# Patient Record
Sex: FEMALE | Race: ASIAN | NOT HISPANIC OR LATINO | Employment: STUDENT | URBAN - METROPOLITAN AREA
[De-identification: names, ages, dates, MRNs, and addresses within clinical notes are randomized per-mention and may not be internally consistent; named-entity substitution may affect disease eponyms.]

---

## 2024-01-30 ENCOUNTER — HOSPITAL ENCOUNTER (EMERGENCY)
Facility: HOSPITAL | Age: 24
Discharge: HOME | End: 2024-01-30
Payer: MEDICAID

## 2024-01-30 ENCOUNTER — APPOINTMENT (OUTPATIENT)
Dept: RADIOLOGY | Facility: HOSPITAL | Age: 24
End: 2024-01-30
Payer: MEDICAID

## 2024-01-30 VITALS
SYSTOLIC BLOOD PRESSURE: 131 MMHG | WEIGHT: 170 LBS | HEIGHT: 63 IN | BODY MASS INDEX: 30.12 KG/M2 | OXYGEN SATURATION: 98 % | HEART RATE: 77 BPM | DIASTOLIC BLOOD PRESSURE: 87 MMHG | TEMPERATURE: 96.8 F | RESPIRATION RATE: 18 BRPM

## 2024-01-30 DIAGNOSIS — N13.2 HYDRONEPHROSIS WITH RENAL AND URETERAL CALCULUS OBSTRUCTION: ICD-10-CM

## 2024-01-30 DIAGNOSIS — N20.2 CALCULUS OF KIDNEY WITH CALCULUS OF URETER: Primary | ICD-10-CM

## 2024-01-30 DIAGNOSIS — N30.01 ACUTE CYSTITIS WITH HEMATURIA: ICD-10-CM

## 2024-01-30 LAB
ALBUMIN SERPL BCP-MCNC: 4.8 G/DL (ref 3.4–5)
ALP SERPL-CCNC: 61 U/L (ref 33–110)
ALT SERPL W P-5'-P-CCNC: 14 U/L (ref 7–45)
ANION GAP SERPL CALC-SCNC: 14 MMOL/L (ref 10–20)
APPEARANCE UR: ABNORMAL
AST SERPL W P-5'-P-CCNC: 12 U/L (ref 9–39)
B-HCG SERPL-ACNC: <2 MIU/ML
BASOPHILS # BLD AUTO: 0.07 X10*3/UL (ref 0–0.1)
BASOPHILS NFR BLD AUTO: 0.6 %
BILIRUB SERPL-MCNC: 0.3 MG/DL (ref 0–1.2)
BILIRUB UR STRIP.AUTO-MCNC: NEGATIVE MG/DL
BUN SERPL-MCNC: 12 MG/DL (ref 6–23)
CALCIUM SERPL-MCNC: 9.6 MG/DL (ref 8.6–10.3)
CHLORIDE SERPL-SCNC: 105 MMOL/L (ref 98–107)
CO2 SERPL-SCNC: 23 MMOL/L (ref 21–32)
COLOR UR: YELLOW
CREAT SERPL-MCNC: 0.68 MG/DL (ref 0.5–1.05)
EGFRCR SERPLBLD CKD-EPI 2021: >90 ML/MIN/1.73M*2
EOSINOPHIL # BLD AUTO: 0.14 X10*3/UL (ref 0–0.7)
EOSINOPHIL NFR BLD AUTO: 1.2 %
ERYTHROCYTE [DISTWIDTH] IN BLOOD BY AUTOMATED COUNT: 11.9 % (ref 11.5–14.5)
FLUAV RNA RESP QL NAA+PROBE: NOT DETECTED
FLUBV RNA RESP QL NAA+PROBE: NOT DETECTED
GLUCOSE SERPL-MCNC: 117 MG/DL (ref 74–99)
GLUCOSE UR STRIP.AUTO-MCNC: NEGATIVE MG/DL
HCT VFR BLD AUTO: 42.1 % (ref 36–46)
HGB BLD-MCNC: 14.2 G/DL (ref 12–16)
IMM GRANULOCYTES # BLD AUTO: 0.08 X10*3/UL (ref 0–0.7)
IMM GRANULOCYTES NFR BLD AUTO: 0.7 % (ref 0–0.9)
KETONES UR STRIP.AUTO-MCNC: NEGATIVE MG/DL
LACTATE SERPL-SCNC: 1.1 MMOL/L (ref 0.4–2)
LEUKOCYTE ESTERASE UR QL STRIP.AUTO: ABNORMAL
LIPASE SERPL-CCNC: 13 U/L (ref 9–82)
LYMPHOCYTES # BLD AUTO: 3.88 X10*3/UL (ref 1.2–4.8)
LYMPHOCYTES NFR BLD AUTO: 32.8 %
MAGNESIUM SERPL-MCNC: 2.11 MG/DL (ref 1.6–2.4)
MCH RBC QN AUTO: 30.1 PG (ref 26–34)
MCHC RBC AUTO-ENTMCNC: 33.7 G/DL (ref 32–36)
MCV RBC AUTO: 89 FL (ref 80–100)
MONOCYTES # BLD AUTO: 0.54 X10*3/UL (ref 0.1–1)
MONOCYTES NFR BLD AUTO: 4.6 %
MUCOUS THREADS #/AREA URNS AUTO: ABNORMAL /LPF
NEUTROPHILS # BLD AUTO: 7.11 X10*3/UL (ref 1.2–7.7)
NEUTROPHILS NFR BLD AUTO: 60.1 %
NITRITE UR QL STRIP.AUTO: NEGATIVE
NRBC BLD-RTO: 0 /100 WBCS (ref 0–0)
PH UR STRIP.AUTO: 5 [PH]
PLATELET # BLD AUTO: 439 X10*3/UL (ref 150–450)
POTASSIUM SERPL-SCNC: 3.7 MMOL/L (ref 3.5–5.3)
PROT SERPL-MCNC: 8.2 G/DL (ref 6.4–8.2)
PROT UR STRIP.AUTO-MCNC: NEGATIVE MG/DL
RBC # BLD AUTO: 4.72 X10*6/UL (ref 4–5.2)
RBC # UR STRIP.AUTO: ABNORMAL /UL
RBC #/AREA URNS AUTO: ABNORMAL /HPF
RSV RNA RESP QL NAA+PROBE: NOT DETECTED
SARS-COV-2 RNA RESP QL NAA+PROBE: NOT DETECTED
SODIUM SERPL-SCNC: 138 MMOL/L (ref 136–145)
SP GR UR STRIP.AUTO: 1.02
SQUAMOUS #/AREA URNS AUTO: ABNORMAL /HPF
UROBILINOGEN UR STRIP.AUTO-MCNC: <2 MG/DL
WBC # BLD AUTO: 11.8 X10*3/UL (ref 4.4–11.3)
WBC #/AREA URNS AUTO: ABNORMAL /HPF

## 2024-01-30 PROCEDURE — 83690 ASSAY OF LIPASE: CPT

## 2024-01-30 PROCEDURE — 87637 SARSCOV2&INF A&B&RSV AMP PRB: CPT

## 2024-01-30 PROCEDURE — 80053 COMPREHEN METABOLIC PANEL: CPT

## 2024-01-30 PROCEDURE — 81001 URINALYSIS AUTO W/SCOPE: CPT

## 2024-01-30 PROCEDURE — 76856 US EXAM PELVIC COMPLETE: CPT

## 2024-01-30 PROCEDURE — 2500000004 HC RX 250 GENERAL PHARMACY W/ HCPCS (ALT 636 FOR OP/ED)

## 2024-01-30 PROCEDURE — 36415 COLL VENOUS BLD VENIPUNCTURE: CPT

## 2024-01-30 PROCEDURE — 99285 EMERGENCY DEPT VISIT HI MDM: CPT

## 2024-01-30 PROCEDURE — 96375 TX/PRO/DX INJ NEW DRUG ADDON: CPT

## 2024-01-30 PROCEDURE — 74176 CT ABD & PELVIS W/O CONTRAST: CPT | Performed by: RADIOLOGY

## 2024-01-30 PROCEDURE — 83605 ASSAY OF LACTIC ACID: CPT

## 2024-01-30 PROCEDURE — 74176 CT ABD & PELVIS W/O CONTRAST: CPT

## 2024-01-30 PROCEDURE — 83735 ASSAY OF MAGNESIUM: CPT

## 2024-01-30 PROCEDURE — 85025 COMPLETE CBC W/AUTO DIFF WBC: CPT

## 2024-01-30 PROCEDURE — 96361 HYDRATE IV INFUSION ADD-ON: CPT

## 2024-01-30 PROCEDURE — 96365 THER/PROPH/DIAG IV INF INIT: CPT

## 2024-01-30 PROCEDURE — 87086 URINE CULTURE/COLONY COUNT: CPT | Mod: PARLAB

## 2024-01-30 PROCEDURE — 84702 CHORIONIC GONADOTROPIN TEST: CPT

## 2024-01-30 RX ORDER — KETOROLAC TROMETHAMINE 30 MG/ML
30 INJECTION, SOLUTION INTRAMUSCULAR; INTRAVENOUS ONCE
Status: COMPLETED | OUTPATIENT
Start: 2024-01-30 | End: 2024-01-30

## 2024-01-30 RX ORDER — CEFTRIAXONE 2 G/50ML
2 INJECTION, SOLUTION INTRAVENOUS ONCE
Status: COMPLETED | OUTPATIENT
Start: 2024-01-30 | End: 2024-01-30

## 2024-01-30 RX ORDER — CEPHALEXIN 500 MG/1
500 CAPSULE ORAL 2 TIMES DAILY
Qty: 14 CAPSULE | Refills: 0 | Status: SHIPPED | OUTPATIENT
Start: 2024-01-30 | End: 2024-02-06

## 2024-01-30 RX ORDER — ONDANSETRON HYDROCHLORIDE 2 MG/ML
4 INJECTION, SOLUTION INTRAVENOUS ONCE
Status: COMPLETED | OUTPATIENT
Start: 2024-01-30 | End: 2024-01-30

## 2024-01-30 RX ORDER — TAMSULOSIN HYDROCHLORIDE 0.4 MG/1
0.4 CAPSULE ORAL DAILY
Qty: 3 CAPSULE | Refills: 0 | Status: SHIPPED | OUTPATIENT
Start: 2024-01-30 | End: 2024-02-02

## 2024-01-30 RX ADMIN — KETOROLAC TROMETHAMINE 30 MG: 30 INJECTION, SOLUTION INTRAMUSCULAR; INTRAVENOUS at 08:05

## 2024-01-30 RX ADMIN — SODIUM CHLORIDE 1000 ML: 9 INJECTION, SOLUTION INTRAVENOUS at 06:50

## 2024-01-30 RX ADMIN — ONDANSETRON 4 MG: 2 INJECTION, SOLUTION INTRAMUSCULAR; INTRAVENOUS at 06:50

## 2024-01-30 RX ADMIN — CEFTRIAXONE SODIUM 2 G: 2 INJECTION, SOLUTION INTRAVENOUS at 10:55

## 2024-01-30 ASSESSMENT — COLUMBIA-SUICIDE SEVERITY RATING SCALE - C-SSRS
6. HAVE YOU EVER DONE ANYTHING, STARTED TO DO ANYTHING, OR PREPARED TO DO ANYTHING TO END YOUR LIFE?: NO
2. HAVE YOU ACTUALLY HAD ANY THOUGHTS OF KILLING YOURSELF?: NO
1. IN THE PAST MONTH, HAVE YOU WISHED YOU WERE DEAD OR WISHED YOU COULD GO TO SLEEP AND NOT WAKE UP?: NO

## 2024-01-30 NOTE — ED TRIAGE NOTES
Pt arrived with /o of LLQ pain for the last couple of days pt states she is not getting better. Pt took advil at home with no relief.

## 2024-01-30 NOTE — ED PROVIDER NOTES
HPI   Chief Complaint   Patient presents with    Abdominal Pain     Pt arrived with /o of LLQ pain for the last couple of days pt states she is not getting better. Pt took advil at home with no relief.        Iker is a 23-year-old female with a past medical history of nephrolithiasis presenting to the emergency department with left lower quadrant abdominal pain.  Her pain started last night while she was laying down.  Denies any strenuous activity or injury.  The pain is a 9 out of 10 and describes it as sharp, shooting and intermittent.  It does radiate into her left flank.  Patient has a history of a kidney stone several years ago, that passed on its own after prescription of Flomax.  She also admits to increased urinary frequency and dysuria.  Denies hematuria, pyuria, vaginal bleeding, and vaginal discharge.  Her last menstrual cycle was one week ago and at her normal.  Denies any chance of pregnancy.  Patient is also feeling nauseous and had 1 episode of nonbloody nonbilious emesis this morning.  She is not taking anything for the pain.  Denies fever, shortness of breath, chest pain, lightheadedness, dizziness, diarrhea, sick contacts.                          Hallie Coma Scale Score: 15                  Patient History   History reviewed. No pertinent past medical history.  Past Surgical History:   Procedure Laterality Date    OTHER SURGICAL HISTORY  03/12/2021    No history of surgery     No family history on file.  Social History     Tobacco Use    Smoking status: Never    Smokeless tobacco: Never   Substance Use Topics    Alcohol use: Yes    Drug use: Never       Physical Exam   ED Triage Vitals [01/30/24 0633]   Temperature Heart Rate Respirations BP   36 °C (96.8 °F) 85 16 129/78      Pulse Ox Temp Source Heart Rate Source Patient Position   98 % Skin -- --      BP Location FiO2 (%)     -- --       Physical Exam  Constitutional:       Comments: Patient does appear to be in pain.  She cannot sit still.    HENT:      Mouth/Throat:      Mouth: Mucous membranes are dry.      Pharynx: Oropharynx is clear. No oropharyngeal exudate or posterior oropharyngeal erythema.   Eyes:      Extraocular Movements: Extraocular movements intact.   Cardiovascular:      Rate and Rhythm: Normal rate and regular rhythm.      Pulses: Normal pulses.      Heart sounds: Normal heart sounds.   Pulmonary:      Effort: Pulmonary effort is normal. No respiratory distress.      Breath sounds: Normal breath sounds. No stridor. No wheezing, rhonchi or rales.   Abdominal:      General: Abdomen is flat. There is no distension.      Palpations: Abdomen is soft.      Tenderness: There is abdominal tenderness. There is no guarding or rebound.      Comments: Tenderness to palpation of left lower quadrant with left-sided CVA tenderness.   Neurological:      General: No focal deficit present.      Mental Status: She is alert.   Psychiatric:         Mood and Affect: Mood normal.         Behavior: Behavior normal.         ED Course & [facility]   ED Course as of 01/30/24 1206   Tue Jan 30, 2024   0733 WBC(!): 11.8 []   0801 Leukocyte Esterase, Urine(!): TRACE []   0801 Blood, Urine(!): MODERATE (2+) []   0801 Appearance, Urine(!): Hazy []      ED Course User Index  [AH] Vee Bean PA-C         Diagnoses as of 01/30/24 1206   Calculus of kidney with calculus of ureter   Hydronephrosis with renal and ureteral calculus obstruction   Acute cystitis with hematuria       Medical Decision Making  Tu is a 23-year-old female with a past medical history of nephrolithiasis presenting to the emergency department with sharp, shooting intermittent left lower quadrant abdominal pain with radiation into her left flank that started last night.  Admits to increased urinary frequency and dysuria.  Denies fever.    Differentials include nephrolithiasis, acute cystitis, pyelonephritis, ectopic pregnancy, pregnancy, appendicitis, diverticulitis, pancreatitis, COVID, RSV,  influenza, etc.  Workup included CBC, CMP, magnesium, lipase, lactate, quantitative hCG, PCR swabs for COVID/influenza/RSV, urinalysis, CT abdomen pelvis without IV contrast, and transabdominal/transpelvic ultrasound.  Quantitative hCG and urine pregnancy negative.  Slightly elevated white count on CBC of 11.8.  There is moderate blood and trace leukocyte Estrace in urine.  Transabdominal/transpelvic ultrasound unremarkable for pelvic abnormalities.  CT abdomen pelvis demonstrated 2 mm obstructing calculus in the distal aspect of the left ureter, just proximal to the UVJ, resulting in mild hydroureter and hydronephrosis. 3 mm nonobstructing calculus in the inferior pole of the left kidney.    Patient was initially treated with 1000 mL normal saline and Zofran.  Holding Toradol until confirmation that patient is not pregnant.  Quantitative hCG negative.  Toradol was administered.  She is feeling much better after the fluids and pain medication.  Her vitals remained stable with a heart rate of 85 and temperature of 36 °C.  Considering there were leukocyte esterase on her urine, patient will be treated with 2 g Rocephin intravenously empirically for infected stone.  I discussed her case with Joseph Guillen PA-C with [facility] urology, who affirmed patient is stable for discharge and she will likely pass the stone on her own.  He recommended prescription of Flomax for facilitation of stone passing.  She has had this medication in the past for a kidney stone and tolerated well.  I recommended taking the Flomax at night to decrease chance of syncope.  Patient was prescribed Flomax and Keflex for continued empiric treatment of infected stone.  Referral for urology ordered. We discussed reasons to return to the emergency room including fever, extreme/worsening pain, and uncontrollable nausea/vomiting.  Patient was agreeable to plan.  She will follow-up with her primary care provider and urology.        Procedure  Procedures      Vee Bean PA-C  01/30/24 1201       Vee Bean PA-C  01/30/24 1207

## 2024-01-30 NOTE — DISCHARGE INSTRUCTIONS
You were seen in the emergency room today for left abdominal pain.  An image of your abdomen showed a 2 mm kidney stone and a 3 mm kidney stone in your left kidney.  Your urine also showed an infection, so you were treated with an antibiotic intravenously in the emergency room.  You will be prescribed the remaining of this antibiotic to be taken orally.  You will also be given Flomax, a medication used to help pass the stone.  I also placed a referral to urology.  Please follow-up with your primary care provider as well.  Reasons to return to the emergency room include worsening pain and fever.

## 2024-01-31 LAB — BACTERIA UR CULT: NORMAL

## 2024-03-04 ENCOUNTER — APPOINTMENT (OUTPATIENT)
Dept: UROLOGY | Facility: CLINIC | Age: 24
End: 2024-03-04
Payer: MEDICAID

## 2024-04-15 ENCOUNTER — APPOINTMENT (OUTPATIENT)
Dept: UROLOGY | Facility: CLINIC | Age: 24
End: 2024-04-15
Payer: MEDICAID

## 2024-05-07 ENCOUNTER — APPOINTMENT (OUTPATIENT)
Dept: UROLOGY | Facility: CLINIC | Age: 24
End: 2024-05-07
Payer: MEDICAID

## 2024-07-02 ENCOUNTER — APPOINTMENT (OUTPATIENT)
Dept: UROLOGY | Facility: CLINIC | Age: 24
End: 2024-07-02
Payer: MEDICAID

## 2024-08-15 ENCOUNTER — APPOINTMENT (OUTPATIENT)
Dept: UROLOGY | Facility: CLINIC | Age: 24
End: 2024-08-15
Payer: MEDICAID

## 2024-08-20 ENCOUNTER — APPOINTMENT (OUTPATIENT)
Dept: UROLOGY | Facility: CLINIC | Age: 24
End: 2024-08-20
Payer: MEDICAID

## 2024-08-20 VITALS
WEIGHT: 172 LBS | DIASTOLIC BLOOD PRESSURE: 81 MMHG | HEART RATE: 77 BPM | BODY MASS INDEX: 30.47 KG/M2 | SYSTOLIC BLOOD PRESSURE: 117 MMHG | TEMPERATURE: 97.5 F

## 2024-08-20 DIAGNOSIS — N20.0 KIDNEY STONES: Primary | ICD-10-CM

## 2024-08-20 LAB
POC APPEARANCE, URINE: CLEAR
POC BILIRUBIN, URINE: NEGATIVE
POC BLOOD, URINE: NEGATIVE
POC COLOR, URINE: YELLOW
POC GLUCOSE, URINE: NEGATIVE MG/DL
POC KETONES, URINE: NEGATIVE MG/DL
POC LEUKOCYTES, URINE: NEGATIVE
POC NITRITE,URINE: NEGATIVE
POC PH, URINE: 6 PH
POC PROTEIN, URINE: NEGATIVE MG/DL
POC SPECIFIC GRAVITY, URINE: 1.02
POC UROBILINOGEN, URINE: 0.2 EU/DL

## 2024-08-20 PROCEDURE — 81003 URINALYSIS AUTO W/O SCOPE: CPT | Performed by: NURSE PRACTITIONER

## 2024-08-20 PROCEDURE — 1036F TOBACCO NON-USER: CPT | Performed by: NURSE PRACTITIONER

## 2024-08-20 PROCEDURE — 99203 OFFICE O/P NEW LOW 30 MIN: CPT | Performed by: NURSE PRACTITIONER

## 2024-08-20 NOTE — PROGRESS NOTES
Subjective   Patient ID: Iker Harper is a 24 y.o. female who presents for No chief complaint on file..  Presents for evaluation of nephrolithiasis. States she first passed a stone in 2021 and again in 2024, both requiring ER visits. Stone in 2024 was 2mm per CT with residual 3mm nonobstructing stone in left kidney. Denies family history of stones. Mild right flank pain at present, also on the left.     Works as a . Drinks 1 liter of water and 500 ml of tea some days. Some days more water.           Review of Systems   All other systems reviewed and are negative.      Objective   Physical Exam  Vitals reviewed.     Constitutional: Patient generally appears stated age. Good nutrition. No deformities. Good attention to grooming.  Neck: No neck masses. Good symmetry. No crepitus. Normal thyroid size and consistency with no masses.  Respiratory: Normal respiratory effort. No intercostal retractions. No use of accessory muscles.  Cardiovascular: Examination of the peripheral vascular system reveals no swelling or varicosities with good pulses. Normal extremity temperature, no edema or tenderness.  Abdomen: Examination of the abdomen reveals no masses or tenderness. No hernias detected. Normal liver and spleen size.   Lymphatic: No palpable lymph nodes in the neck, axilla, groin or other locations  Skin: Inspection of the skin reveals no rashes, lesions, ulcers.  Neurologic/Psychiatric: Oriented to time, place and person. Normal mood and affect with no depression, anxiety, or agitation.    === 01/30/24 ===    CT ABDOMEN PELVIS WO IV CONTRAST    - Impression -  2 mm obstructing calculus in the distal aspect of the left ureter,  just proximal to the UVJ, resulting in mild hydroureter and  hydronephrosis.  3 mm nonobstructing calculus in the inferior pole of the left kidney.    MACRO:  None.    Signed by: Memo Magdaleno 1/30/2024 8:21 AM  Dictation workstation:   [id number]    Office Visit on 08/20/2024   Component Date  Value Ref Range Status    POC Color, Urine 08/20/2024 Yellow  Straw, Yellow, Light-Yellow Final    POC Appearance, Urine 08/20/2024 Clear  Clear Final    POC Glucose, Urine 08/20/2024 NEGATIVE  NEGATIVE mg/dl Final    POC Bilirubin, Urine 08/20/2024 NEGATIVE  NEGATIVE Final    POC Ketones, Urine 08/20/2024 NEGATIVE  NEGATIVE mg/dl Final    POC Specific Gravity, Urine 08/20/2024 1.025  1.005 - 1.035 Final    POC Blood, Urine 08/20/2024 NEGATIVE  NEGATIVE Final    POC PH, Urine 08/20/2024 6.0  No Reference Range Established PH Final    POC Protein, Urine 08/20/2024 NEGATIVE  NEGATIVE, 30 (1+) mg/dl Final    POC Urobilinogen, Urine 08/20/2024 0.2  0.2, 1.0 EU/DL Final    Poc Nitrite, Urine 08/20/2024 NEGATIVE  NEGATIVE Final    POC Leukocytes, Urine 08/20/2024 NEGATIVE  NEGATIVE Final         Assessment/Plan   Diagnoses and all orders for this visit:  Kidney stones  -     POCT UA Automated manually resulted  -     US renal complete; Future    Recommended kidney ultrasound to evaluate current stone burden. Plan to complete metabolic eval with [facility]. Discussed rationale. Patient is in agreement with plan. Follow up after testing.        Geri Teran, CALRA-CNP 08/20/24 9:49 AM

## 2024-08-27 ENCOUNTER — APPOINTMENT (OUTPATIENT)
Dept: RADIOLOGY | Facility: HOSPITAL | Age: 24
End: 2024-08-27
Payer: MEDICAID

## 2024-08-27 DIAGNOSIS — N20.0 KIDNEY STONES: ICD-10-CM

## 2024-08-27 PROCEDURE — 76770 US EXAM ABDO BACK WALL COMP: CPT

## 2024-09-11 ENCOUNTER — APPOINTMENT (OUTPATIENT)
Dept: PRIMARY CARE | Facility: CLINIC | Age: 24
End: 2024-09-11
Payer: MEDICAID

## 2024-10-21 ENCOUNTER — APPOINTMENT (OUTPATIENT)
Dept: UROLOGY | Facility: CLINIC | Age: 24
End: 2024-10-21
Payer: MEDICAID

## 2024-12-03 ENCOUNTER — APPOINTMENT (OUTPATIENT)
Dept: UROLOGY | Facility: CLINIC | Age: 24
End: 2024-12-03
Payer: MEDICAID

## 2024-12-03 VITALS — SYSTOLIC BLOOD PRESSURE: 130 MMHG | DIASTOLIC BLOOD PRESSURE: 69 MMHG | HEART RATE: 75 BPM | TEMPERATURE: 97.1 F

## 2024-12-03 DIAGNOSIS — R82.994 HYPERCALCIURIA: Primary | ICD-10-CM

## 2024-12-03 DIAGNOSIS — R82.993 HYPERURICOSURIA: ICD-10-CM

## 2024-12-03 DIAGNOSIS — R82.992 HYPEROXALURIA: ICD-10-CM

## 2024-12-03 DIAGNOSIS — N20.0 KIDNEY STONES: ICD-10-CM

## 2024-12-03 PROCEDURE — 99214 OFFICE O/P EST MOD 30 MIN: CPT | Performed by: NURSE PRACTITIONER

## 2024-12-03 RX ORDER — CHLORTHALIDONE 25 MG/1
25 TABLET ORAL DAILY
Qty: 30 TABLET | Refills: 2 | Status: SHIPPED | OUTPATIENT
Start: 2024-12-03 | End: 2025-03-03

## 2024-12-03 NOTE — PROGRESS NOTES
Subjective   Patient ID: Iker Harper is a 24 y.o. female who presents for Nephrolithiasis.    Presents for review of metabolic eval for stones. States she first passed a stone in 2021 and again in 2024, both requiring ER visits. Stone in 2024 was 2mm per CT with residual 3mm nonobstructing stone in left kidney. Denies family history of stones. Ultrasound in August 2024 did not identify any stones. Asymptomatic at present.      Works as a . Drinks 1 liter of water and 500 ml of tea some days. At least 20 oz of coffee. Some days more water.           Review of Systems   All other systems reviewed and are negative.      Objective   Physical Exam  Alert and oriented x3  Moist mucous membranes  Breathes easily on room air  Abdomen soft, nondistended  No edema  No scleral icterus  No focal neurological deficits  Appears stated age, no acute distress    Assessment/Plan   Diagnoses and all orders for this visit:  Hypercalciuria  -     Referral to Nutrition Services; Future  -     chlorthalidone (Hygroton) 25 mg tablet; Take 1 tablet (25 mg) by mouth once daily.  -     Basic metabolic panel; Future  Hyperoxaluria  -     Referral to Nutrition Services; Future  -     chlorthalidone (Hygroton) 25 mg tablet; Take 1 tablet (25 mg) by mouth once daily.  -     Basic metabolic panel; Future  Hyperuricosuria  -     Referral to Nutrition Services; Future  -     chlorthalidone (Hygroton) 25 mg tablet; Take 1 tablet (25 mg) by mouth once daily.  -     Basic metabolic panel; Future  Kidney stones  -     US renal complete; Future     Reviewed 24 hour urine which identified significant hypercalciuria, hyperoxaluria, and hyperuricosuria.  She is doing fairly well in regards to urine output which was 2.3 L.  We briefly discussed dietary changes and elected to refer to dietician for education. Plan for follow up in March with ultrasound and labs prior to appt. Will initiate chlorthalidone for treatment of extreme hypercalcuria at this  time.       Geri Teran, CARLA-CNP 12/03/24 3:59 PM

## 2025-03-04 ENCOUNTER — HOSPITAL ENCOUNTER (OUTPATIENT)
Dept: RADIOLOGY | Facility: HOSPITAL | Age: 25
Discharge: HOME | End: 2025-03-04
Payer: MEDICAID

## 2025-03-04 DIAGNOSIS — N20.0 KIDNEY STONES: ICD-10-CM

## 2025-03-04 PROCEDURE — 76770 US EXAM ABDO BACK WALL COMP: CPT

## 2025-03-18 ENCOUNTER — APPOINTMENT (OUTPATIENT)
Dept: UROLOGY | Facility: CLINIC | Age: 25
End: 2025-03-18
Payer: MEDICAID

## 2025-03-18 VITALS — DIASTOLIC BLOOD PRESSURE: 68 MMHG | SYSTOLIC BLOOD PRESSURE: 111 MMHG | HEART RATE: 87 BPM | TEMPERATURE: 97.1 F

## 2025-03-18 DIAGNOSIS — N20.0 KIDNEY STONES: ICD-10-CM

## 2025-03-18 DIAGNOSIS — R82.992 HYPEROXALURIA: ICD-10-CM

## 2025-03-18 DIAGNOSIS — R82.993 HYPERURICOSURIA: ICD-10-CM

## 2025-03-18 DIAGNOSIS — R82.994 HYPERCALCIURIA: Primary | ICD-10-CM

## 2025-03-18 PROCEDURE — 99213 OFFICE O/P EST LOW 20 MIN: CPT | Performed by: NURSE PRACTITIONER

## 2025-03-18 PROCEDURE — 1036F TOBACCO NON-USER: CPT | Performed by: NURSE PRACTITIONER

## 2025-03-18 RX ORDER — CHLORTHALIDONE 25 MG/1
25 TABLET ORAL DAILY
Qty: 90 TABLET | Refills: 3 | Status: SHIPPED | OUTPATIENT
Start: 2025-03-18 | End: 2026-03-18

## 2025-03-18 NOTE — PROGRESS NOTES
Subjective   Patient ID: Iker Harper is a 24 y.o. female who presents for U/s Review .  Presents for follow up of stones States she first passed a stone in 2021 and again in 2024, both requiring ER visits. Stone in 2024 was 2mm per CT with residual 3mm nonobstructing stone in left kidney. Denies family history of stones. Ultrasound in August 2024 did not identify any stones. Asymptomatic at present.      Works as a . Drinks 1 liter of water and 500 ml of tea some days. Cutting down on coffee, trying to drink more water.           Review of Systems   All other systems reviewed and are negative.      Objective   Physical Exam  Vitals reviewed.     Alert and oriented x3  Moist mucous membranes  Breathes easily on room air  Abdomen soft, nondistended  No edema  No scleral icterus  No focal neurological deficits  Appears stated age, no acute distress    === 03/04/25 ===    US RENAL COMPLETE    - Impression -  No hydronephrosis bilaterally.    No focal urinary bladder abnormality identified.    MACRO:  None.    Signed by: Dylan Jack 3/5/2025 5:58 PM  Dictation workstation:   [id number]      Assessment/Plan   Diagnoses and all orders for this visit:  Hypercalciuria  -     chlorthalidone (Hygroton) 25 mg tablet; Take 1 tablet (25 mg) by mouth once daily.  -     Basic metabolic panel; Future  Kidney stones  -     Basic metabolic panel; Future  Hyperoxaluria  -     chlorthalidone (Hygroton) 25 mg tablet; Take 1 tablet (25 mg) by mouth once daily.  Hyperuricosuria  -     chlorthalidone (Hygroton) 25 mg tablet; Take 1 tablet (25 mg) by mouth once daily.    Plan to continue on chlorthalidone. Will check labs in 1 month for electrolyte follow up. No stones noted. Annual follow up with ultrasound at that time.        CARLA Israel-CNP 03/18/25 4:10 PM

## 2025-04-18 DIAGNOSIS — N20.0 KIDNEY STONES: ICD-10-CM

## 2025-04-18 DIAGNOSIS — R82.994 HYPERCALCIURIA: ICD-10-CM

## 2026-03-16 ENCOUNTER — APPOINTMENT (OUTPATIENT)
Dept: UROLOGY | Facility: CLINIC | Age: 26
End: 2026-03-16
Payer: MEDICAID